# Patient Record
Sex: FEMALE | Race: WHITE | NOT HISPANIC OR LATINO | URBAN - NONMETROPOLITAN AREA
[De-identification: names, ages, dates, MRNs, and addresses within clinical notes are randomized per-mention and may not be internally consistent; named-entity substitution may affect disease eponyms.]

---

## 2023-05-01 ENCOUNTER — OFFICE (OUTPATIENT)
Dept: URBAN - NONMETROPOLITAN AREA CLINIC 1 | Facility: CLINIC | Age: 33
End: 2023-05-01

## 2023-05-01 VITALS
HEART RATE: 84 BPM | WEIGHT: 189 LBS | SYSTOLIC BLOOD PRESSURE: 138 MMHG | DIASTOLIC BLOOD PRESSURE: 88 MMHG | HEIGHT: 63 IN

## 2023-05-01 DIAGNOSIS — E28.2 POLYCYSTIC OVARIAN SYNDROME: ICD-10-CM

## 2023-05-01 DIAGNOSIS — K59.00 CONSTIPATION, UNSPECIFIED: ICD-10-CM

## 2023-05-01 DIAGNOSIS — E88.81 METABOLIC SYNDROME AND OTHER INSULIN RESISTANCE: ICD-10-CM

## 2023-05-01 DIAGNOSIS — K58.9 IRRITABLE BOWEL SYNDROME WITHOUT DIARRHEA: ICD-10-CM

## 2023-05-01 DIAGNOSIS — K21.9 GASTRO-ESOPHAGEAL REFLUX DISEASE WITHOUT ESOPHAGITIS: ICD-10-CM

## 2023-05-01 DIAGNOSIS — F41.9 ANXIETY DISORDER, UNSPECIFIED: ICD-10-CM

## 2023-05-01 DIAGNOSIS — K64.9 UNSPECIFIED HEMORRHOIDS: ICD-10-CM

## 2023-05-01 PROCEDURE — 99213 OFFICE O/P EST LOW 20 MIN: CPT | Performed by: NURSE PRACTITIONER

## 2023-05-01 NOTE — SERVICENOTES
She will continue using MiraLAX as needed, to keep her stools soft.  Continue PPI for GERD.  Follow-up in a year, or before that as needed.

## 2023-05-01 NOTE — SERVICEHPINOTES
She has episodes of acid reflux, but does well on pantoprazole 40 mg daily. She is using MiraLAX as needed to keep her stools soft. She had a normal colonoscopy and EGD by Dr. Dumont. It showed hemorrhoids, but otherwise nothing concerning.
br
Her chronic illnesses include anxiety and depression, IBS, chronic constipation, polycystic ovarian syndrome, insulin resistance, hyperlipidemia and panic attacks.br
br Colonoscopy by Dr. Dumont 6/27/22-
br Colon mucosa normal without any mass, lesions, polyps, diverticulosis, ulceration, or loss of vascularity.brGrade I nonbleeding hemorrhoids found during retroflexion.brColon otherwise normal on direct and retroflexion view.brEstimated blood loss: NonebrUnplanned events: There were no unplanned events.brRecommendations: brRepeat colonoscopy at age of 44 for age-appropriate colon cancer screening. This can change due to changes in guidelines or family history.
br
br   Normal EGD same day.